# Patient Record
Sex: MALE | Race: WHITE | NOT HISPANIC OR LATINO | Employment: OTHER | URBAN - METROPOLITAN AREA
[De-identification: names, ages, dates, MRNs, and addresses within clinical notes are randomized per-mention and may not be internally consistent; named-entity substitution may affect disease eponyms.]

---

## 2017-11-09 PROBLEM — Z86.010 HISTORY OF COLON POLYPS: Status: ACTIVE | Noted: 2017-11-09

## 2018-09-27 PROBLEM — R10.11 RUQ PAIN: Status: ACTIVE | Noted: 2018-09-27

## 2018-09-27 PROBLEM — J45.40 MODERATE PERSISTENT ASTHMA: Status: ACTIVE | Noted: 2018-09-27

## 2018-09-27 PROBLEM — I63.9 CVA (CEREBRAL VASCULAR ACCIDENT): Status: ACTIVE | Noted: 2018-09-27

## 2018-10-30 PROBLEM — D84.9 IMMUNOCOMPROMISED STATE: Status: ACTIVE | Noted: 2018-10-30

## 2019-06-24 PROBLEM — K02.9 DENTAL DECAY: Status: ACTIVE | Noted: 2019-06-24

## 2019-06-24 PROBLEM — G47.00 INSOMNIA: Status: ACTIVE | Noted: 2019-06-24

## 2019-07-31 PROBLEM — M79.604 PAIN IN BOTH LOWER EXTREMITIES: Status: ACTIVE | Noted: 2019-07-31

## 2019-07-31 PROBLEM — Z82.49 FAMILY HISTORY OF CARDIOVASCULAR DISEASE: Status: ACTIVE | Noted: 2019-07-31

## 2019-07-31 PROBLEM — R94.39 ABNORMAL CARDIOVASCULAR STRESS TEST: Status: ACTIVE | Noted: 2019-07-31

## 2019-07-31 PROBLEM — R07.9 CHEST PAIN: Status: ACTIVE | Noted: 2019-07-31

## 2019-07-31 PROBLEM — M79.605 PAIN IN BOTH LOWER EXTREMITIES: Status: ACTIVE | Noted: 2019-07-31

## 2019-07-31 PROBLEM — I47.29 NSVT (NONSUSTAINED VENTRICULAR TACHYCARDIA): Status: ACTIVE | Noted: 2019-07-31

## 2019-07-31 PROBLEM — Z87.891 HISTORY OF TOBACCO ABUSE: Status: ACTIVE | Noted: 2019-07-31

## 2019-07-31 PROBLEM — R73.01 IFG (IMPAIRED FASTING GLUCOSE): Status: ACTIVE | Noted: 2019-07-31

## 2019-07-31 PROBLEM — E78.5 DYSLIPIDEMIA: Status: ACTIVE | Noted: 2019-07-31

## 2019-10-04 PROBLEM — I51.89 DIASTOLIC DYSFUNCTION: Status: ACTIVE | Noted: 2019-10-04

## 2019-10-07 PROBLEM — R94.39 ABNORMAL CARDIOVASCULAR STRESS TEST: Status: RESOLVED | Noted: 2019-07-31 | Resolved: 2019-10-07

## 2019-10-07 PROBLEM — I25.118 CORONARY ARTERY DISEASE OF NATIVE ARTERY OF NATIVE HEART WITH STABLE ANGINA PECTORIS: Status: ACTIVE | Noted: 2019-10-07

## 2019-10-07 PROBLEM — I37.1 NONRHEUMATIC PULMONARY VALVE INSUFFICIENCY: Status: ACTIVE | Noted: 2019-10-07

## 2019-10-07 PROBLEM — J98.4 LUNG DISEASE, RESTRICTIVE: Status: ACTIVE | Noted: 2019-10-07

## 2020-12-12 ENCOUNTER — SPECIALTY PHARMACY (OUTPATIENT)
Dept: PHARMACY | Facility: CLINIC | Age: 65
End: 2020-12-12

## 2020-12-21 ENCOUNTER — PATIENT MESSAGE (OUTPATIENT)
Dept: PHARMACY | Facility: CLINIC | Age: 65
End: 2020-12-21

## 2020-12-21 ENCOUNTER — TELEPHONE (OUTPATIENT)
Dept: PHARMACY | Facility: CLINIC | Age: 65
End: 2020-12-21

## 2020-12-28 ENCOUNTER — SPECIALTY PHARMACY (OUTPATIENT)
Dept: PHARMACY | Facility: CLINIC | Age: 65
End: 2020-12-28

## 2020-12-28 DIAGNOSIS — L40.9 PSORIASIS: Primary | ICD-10-CM

## 2020-12-28 NOTE — TELEPHONE ENCOUNTER
Specialty Pharmacy - Initial Clinical Assessment    Specialty Medication Orders Linked to Encounter      Most Recent Value   Medication #1  adalimumab (HUMIRA PEN) 40 mg/0.8 mL PnKt (Order#[id number], Rx#[id number])        Subjective    Clint Babb Jr. is a 65 y.o. male, who is followed by the specialty pharmacy service for management and education.    Recent Encounters     Date Type Provider Description    12/28/2020 Specialty Pharmacy Magdalena Dutton PharmD Initial Clinical Assessment    12/12/2020 Specialty Pharmacy Priscila Castillo PharmD Referral Authorization        Clinical call attempts since last clinical assessment   12/21/2020  3:01 PM - Specialty Pharmacy - Clinical Assessment by Magdalena Dutton, Araseli     Today he received education before his first fill with [facility].    Current Outpatient Medications   Medication Sig    adalimumab (HUMIRA PEN) 40 mg/0.8 mL PnKt Inject 40 mg into the skin every other week (Patient not taking: Reported on 12/22/2020)    albuterol (PROAIR HFA) 90 mcg/actuation inhaler Inhale 2 puffs into the lungs every 6 (six) hours as needed for Wheezing. Rescue    ammonium lactate 12 % Crea To be used on the legs twice a day    aspirin (ECOTRIN) 81 MG EC tablet Take 81 mg by mouth once daily.    atorvastatin (LIPITOR) 80 MG tablet Take 1 tablet (80 mg total) by mouth once daily.    BREO ELLIPTA 200-25 mcg/dose DsDv diskus inhaler INHALE 1 PUFF BY MOUTH INTO THE LUNGS ONCE A DAY    ergocalciferol (ERGOCALCIFEROL) 50,000 unit Cap Take 1 capsule (50,000 Units total) by mouth every 7 days. for 12 doses    lisinopriL (PRINIVIL,ZESTRIL) 2.5 MG tablet Take 1 tablet (2.5 mg total) by mouth every evening.    metoprolol tartrate (LOPRESSOR) 25 MG tablet Take 1 tablet (25 mg total) by mouth 2 (two) times daily.    pantoprazole (PROTONIX) 40 MG tablet TAKE 1 TABLET BY MOUTH ONCE A DAY BEFORE MEALS    tadalafiL (CIALIS) 20 MG Tab Take 1 tablet (20 mg total) by  mouth once daily.    tamsulosin (FLOMAX) 0.4 mg Cap TAKE 1 CAPSULE BY MOUTH ONCE A DAY    traZODone (DESYREL) 50 MG tablet Take 1 tablet (50 mg total) by mouth nightly as needed for Insomnia.    triamcinolone acetonide 0.1% (KENALOG) 0.1 % cream Apply topically 2 (two) times daily. Apply to upper and lower extremities, avoid face and genitals.    umeclidinium (INCRUSE ELLIPTA) 62.5 mcg/actuation inhalation capsule Inhale 1 capsule (63 mcg total) into the lungs once daily. Controller   Last reviewed on 12/28/2020 12:06 PM by Magdalena Dutton, PharmD    Review of patient's allergies indicates:  No Known AllergiesLast reviewed on  12/22/2020 7:44 AM by Joslyn Cisneros    Drug Interactions    Drug interactions evaluated: yes  Clinically relevant drug interactions identified: no  Provided the patient with educational material regarding drug interactions: not applicable         Adverse Effects    Rash: Pos       Assessment Questions - Documented Responses      Most Recent Value   Assessment   Medication Reconciliation completed for patient  Yes   During the past 4 weeks, has patient missed any activities due to condition or medication?  No   During the past 4 weeks, did patient have any of the following urgent care visits?  None   Goals of Therapy Status  Partially achieving   Welcome packet contents reviewed and discussed with patient?  Yes   Assesment completed?  Yes   Plan  Therapy continued   Do you need to open a clinical intervention (i-vent)?  No   Do you want to schedule first shipment?  Yes   Medication #1 Assessment Info   Patient status  Existing medication, New to [facility]   Is this medication appropriate for the patient?  Yes   Is this medication effective?  Yes        Refill Questions - Documented Responses      Most Recent Value   Relationship to patient of person spoken to?  Self   HIPAA/medical authority confirmed?  Yes   Can the patient store medication/sharps container properly (at the correct temperature,  "away from children/pets, etc.)?  Yes   Can the patient call emergency services (911) in the event of an emergency?  Yes   Does the patient have any concerns or questions about taking or administering this medication as prescribed?  No   During the past 4 weeks, has patient missed any activities due to condition or medication?  No   During the past 4 weeks, did patient have any of the following urgent care visits?  None   How will the patient receive the medication?  Mail   When does the patient need to receive the medication?  12/30/20   Shipping Address  Home   Address in [facility] confirmed and updated if neccessary?  Yes   Expected Copay ($)  0   Is the patient able to afford the medication copay?  Yes   Payment Method  zero copay   Days supply of Refill  28   Refill activity completed?  Yes   Refill activity plan  Refill scheduled   Shipment/Pickup Date:  12/28/20          Objective    He has a past medical history of Arthritis, Carpal tunnel syndrome, GERD (gastroesophageal reflux disease), Immunocompromised state (10/30/2018), Joint pain, Moderate persistent asthma (9/27/2018), Psoriasis, Skin disease, and SOB (shortness of breath).    BP Readings from Last 4 Encounters:   12/22/20 (!) 129/59   11/05/20 118/67   09/18/20 107/63   07/21/20 120/60     Ht Readings from Last 4 Encounters:   12/22/20 5' 7" (1.702 m)   11/30/20 5' 7" (1.702 m)   09/18/20 5' 7" (1.702 m)   07/21/20 5' 7" (1.702 m)     Wt Readings from Last 4 Encounters:   12/22/20 90.3 kg (199 lb 1.2 oz)   11/30/20 89.4 kg (197 lb 1.5 oz)   11/05/20 88.6 kg (195 lb 3.5 oz)   09/18/20 88.8 kg (195 lb 11.2 oz)     Recent Labs   Lab Result Units 12/15/20  0641 10/29/20  1035   Creatinine mg/dL 1.2 1.1   ALT U/L 21 27   AST U/L 18 18     The goals of Psoriasis treatment include:  · Reducing the size, thickness and extent of lesions and erythema  · Relieving the symptoms of psoriasis  · Improving and maintaining physical function  · Preventing " infection and other complications of treatment  · Reducing long term complications of psoriasis  · Minimizing psychological impact on overall well-being  · Improving or maintaining quality of life  · Maintaining optimal therapy adherence  · Minimizing and managing side effects    Goals of Therapy Status: Partially achieving    Assessment/Plan  Patient plans to start therapy on 12/30/20      Indication, dosage, appropriateness, effectiveness, safety and convenience of his specialty medication(s) were reviewed today.     Patient Counseling    Counseled the patient on the following: doses and administration discussed, safe handling, storage, and disposal discussed  Other topic: Pt declined consult       Humira consult declined, experienced to medication. Pt has been on Humira for a few years, but has been off for 2 months due to insurance. Pt plans to restart Humira on Wednesday 12/30. Advised pt to use a calendar or phone reminders to keep track of his injections. Pt reported since being off of therapy, he has had a few psoriasis spots return on his lower back and upper legs. Pt rated his pain as 7/10 (10 being best). Pt does not work. Pt reported no missed plans or visits to ER/urgent care in the last month due to symptoms. Medication list was verified against Epic and is current. Briefly reviewed dosage, storage, and administration with pt. [facility] refill process and services explained to pt. [facility] will call in 3 weeks for refill. Pt verbalized understanding. Pt had no further questions or concerns and was encouraged to call [facility] should any arise.    Tasks added this encounter   1/20/2021 - Refill Call (Auto Added)  6/19/2021 - Clinical - Follow Up Assesement (180 day)   Tasks due within next 3 months   No tasks due.     Magdalena Dutton, PharmD  [facility] - Specialty Pharmacy  [address] [place] [id number]  Phone: [phone]  Fax: [phone]

## 2021-01-25 ENCOUNTER — SPECIALTY PHARMACY (OUTPATIENT)
Dept: PHARMACY | Facility: CLINIC | Age: 66
End: 2021-01-25

## 2021-02-19 ENCOUNTER — SPECIALTY PHARMACY (OUTPATIENT)
Dept: PHARMACY | Facility: CLINIC | Age: 66
End: 2021-02-19

## 2021-03-15 ENCOUNTER — SPECIALTY PHARMACY (OUTPATIENT)
Dept: PHARMACY | Facility: CLINIC | Age: 66
End: 2021-03-15

## 2021-03-16 PROBLEM — I10 ESSENTIAL HYPERTENSION: Status: ACTIVE | Noted: 2021-03-16

## 2021-03-16 PROBLEM — I25.118 CORONARY ARTERY DISEASE OF NATIVE ARTERY OF NATIVE HEART WITH STABLE ANGINA PECTORIS: Status: RESOLVED | Noted: 2019-10-07 | Resolved: 2021-03-16

## 2021-03-16 PROBLEM — I25.10 NON-OCCLUSIVE CORONARY ARTERY DISEASE: Status: ACTIVE | Noted: 2021-03-16

## 2021-04-14 ENCOUNTER — SPECIALTY PHARMACY (OUTPATIENT)
Dept: PHARMACY | Facility: CLINIC | Age: 66
End: 2021-04-14

## 2021-05-14 ENCOUNTER — SPECIALTY PHARMACY (OUTPATIENT)
Dept: PHARMACY | Facility: CLINIC | Age: 66
End: 2021-05-14

## 2021-06-09 ENCOUNTER — SPECIALTY PHARMACY (OUTPATIENT)
Dept: PHARMACY | Facility: CLINIC | Age: 66
End: 2021-06-09

## 2021-07-01 ENCOUNTER — PATIENT MESSAGE (OUTPATIENT)
Dept: ADMINISTRATIVE | Facility: OTHER | Age: 66
End: 2021-07-01

## 2021-07-07 ENCOUNTER — SPECIALTY PHARMACY (OUTPATIENT)
Dept: PHARMACY | Facility: CLINIC | Age: 66
End: 2021-07-07

## 2021-08-04 ENCOUNTER — SPECIALTY PHARMACY (OUTPATIENT)
Dept: PHARMACY | Facility: CLINIC | Age: 66
End: 2021-08-04

## 2021-09-06 ENCOUNTER — PATIENT MESSAGE (OUTPATIENT)
Dept: PHARMACY | Facility: CLINIC | Age: 66
End: 2021-09-06

## 2021-09-27 ENCOUNTER — SPECIALTY PHARMACY (OUTPATIENT)
Dept: PHARMACY | Facility: CLINIC | Age: 66
End: 2021-09-27

## 2021-11-09 ENCOUNTER — SPECIALTY PHARMACY (OUTPATIENT)
Dept: PHARMACY | Facility: CLINIC | Age: 66
End: 2021-11-09

## 2021-11-09 RX ORDER — TADALAFIL 20 MG/1
20 TABLET ORAL DAILY PRN
COMMUNITY
End: 2022-01-05 | Stop reason: SDUPTHER

## 2021-11-09 RX ORDER — UBIDECARENONE 75 MG
500 CAPSULE ORAL DAILY
COMMUNITY

## 2021-12-01 ENCOUNTER — SPECIALTY PHARMACY (OUTPATIENT)
Dept: PHARMACY | Facility: CLINIC | Age: 66
End: 2021-12-01

## 2021-12-31 ENCOUNTER — SPECIALTY PHARMACY (OUTPATIENT)
Dept: PHARMACY | Facility: CLINIC | Age: 66
End: 2021-12-31

## 2022-02-01 ENCOUNTER — SPECIALTY PHARMACY (OUTPATIENT)
Dept: PHARMACY | Facility: CLINIC | Age: 67
End: 2022-02-01

## 2022-02-01 NOTE — TELEPHONE ENCOUNTER
Specialty Pharmacy - Refill Coordination    Specialty Medication Orders Linked to Encounter    Flowsheet Row Most Recent Value   Medication #1 adalimumab (HUMIRA PEN) PnKt injection (Order#[id number], Rx#[id number])          Refill Questions - Documented Responses    Flowsheet Row Most Recent Value   Patient Availability and HIPAA Verification    Does patient want to proceed with activity? Yes   HIPAA/medical authority confirmed? Yes   Relationship to patient of person spoken to? Self   Refill Screening Questions    Changes to allergies? No   Changes to medications? No   New conditions since last clinic visit? No   Unplanned office visit, urgent care, ED, or hospital admission in the last 4 weeks? No   How does patient/caregiver feel medication is working? Good   Financial problems or insurance changes? No   How many doses of your specialty medications were missed in the last 4 weeks? 0   Would patient like to speak to a pharmacist? No   When does the patient need to receive the medication? 02/09/22   Refill Delivery Questions    How will the patient receive the medication? Delivery Martha   When does the patient need to receive the medication? 02/09/22   Shipping Address Home   Address in [facility] confirmed and updated if neccessary? Yes   Expected Copay ($) 0   Is the patient able to afford the medication copay? Yes   Payment Method zero copay   Days supply of Refill 28   Supplies needed? No supplies needed   Refill activity completed? Yes   Refill activity plan Refill scheduled   Shipment/Pickup Date: 02/07/22          Current Outpatient Medications   Medication Sig    adalimumab (HUMIRA PEN) PnKt injection Inject 40 mg (1 pen) into the skin every other week. (Patient taking differently: Inject 40 mg (1 pen) into the skin every other week.)    albuterol (PROAIR HFA) 90 mcg/actuation inhaler Inhale 2 puffs into the lungs every 6 (six) hours as needed for Wheezing. Rescue    ammonium lactate 12 % Crea To  be used on the legs twice a day    aspirin (ECOTRIN) 81 MG EC tablet Take 81 mg by mouth once daily.    atorvastatin (LIPITOR) 80 MG tablet Take 1 tablet (80 mg total) by mouth once daily.    cyanocobalamin 500 MCG tablet Take 500 mcg by mouth once daily.    fluticasone-umeclidin-vilanter (TRELEGY ELLIPTA) 200-62.5-25 mcg inhaler Inhale 1 puff into the lungs once daily.    lisinopriL (PRINIVIL,ZESTRIL) 2.5 MG tablet Take 1 tablet (2.5 mg total) by mouth every evening.    metoprolol tartrate (LOPRESSOR) 25 MG tablet TAKE 1 TABLET BY MOUTH TWICE DAILY    pantoprazole (PROTONIX) 40 MG tablet TAKE 1 TABLET BY MOUTH ONCE A DAY BEFORE MEALS    tadalafiL (CIALIS) 20 MG Tab Take 1 tablet (20 mg total) by mouth daily as needed.    tamsulosin (FLOMAX) 0.4 mg Cap Take 1 capsule (0.4 mg total) by mouth once daily.    traZODone (DESYREL) 50 MG tablet Take 1 tablet (50 mg total) by mouth nightly as needed for Insomnia.    triamcinolone acetonide 0.1% (KENALOG) 0.1 % cream aaa bid x 1-2 wks, tk 1 wk off    vitamin D (VITAMIN D3) 1000 units Tab Take 1,000 Units by mouth once daily. Pt states takes 2 pills in morning   Last reviewed on 1/11/2022  3:55 PM by Sy Bond MD    Review of patient's allergies indicates:  No Known Allergies Last reviewed on  1/11/2022 3:55 PM by Sy Bond      Tasks added this encounter   No tasks added.   Tasks due within next 3 months   1/29/2022 - Refill Call (Auto Added)     Jean Lau  [facility] - Specialty Pharmacy  [address] [place] [id number]  Phone: [phone]  Fax: [phone]

## 2022-02-03 PROBLEM — K57.20 DIVERTICULITIS OF LARGE INTESTINE WITH PERFORATION AND ABSCESS: Status: ACTIVE | Noted: 2022-02-03

## 2022-02-25 ENCOUNTER — PATIENT OUTREACH (OUTPATIENT)
Dept: ADMINISTRATIVE | Facility: CLINIC | Age: 67
End: 2022-02-25

## 2022-02-25 NOTE — PROGRESS NOTES
C3 nurse spoke with Clint Babb Jr. for a TCC post hospital discharge follow up call. The patient does not have a scheduled HOSFU appointment with MILADY Moreno within 7 days post hospital discharge date 2/23/2022. C3 nurse was unable to schedule HOSFU appointment in Epic- do not have access to schedule with patient's PCP.    Message sent to PCP staff requesting they contact patient and schedule follow up appointment.

## 2022-03-01 PROBLEM — R62.7 FAILURE TO THRIVE IN ADULT: Status: ACTIVE | Noted: 2022-03-01

## 2022-03-09 ENCOUNTER — PATIENT OUTREACH (OUTPATIENT)
Dept: ADMINISTRATIVE | Facility: CLINIC | Age: 67
End: 2022-03-09

## 2022-03-09 NOTE — PROGRESS NOTES
C3 nurse spoke with Clint Babb Jr.  for a TCC post hospital discharge follow up call. The patient does not have a scheduled HOSFU appointment with MILADY Moreno  within 5-7 days post hospital discharge date 03/08/2022  C3 nurse was unable to schedule HOSFU appointment in [facility].  Please contact patient and schedule follow up appointment using HOSFU visit type on or before 03/15/2022

## 2022-04-18 ENCOUNTER — PATIENT MESSAGE (OUTPATIENT)
Dept: ADMINISTRATIVE | Facility: OTHER | Age: 67
End: 2022-04-18

## 2022-05-03 ENCOUNTER — PATIENT OUTREACH (OUTPATIENT)
Dept: ADMINISTRATIVE | Facility: CLINIC | Age: 67
End: 2022-05-03

## 2022-05-03 NOTE — PROGRESS NOTES
C3 nurse spoke with Clint Babb Jr.   for a TCC post hospital discharge follow up call. The patient has a scheduled HOSFU appointment with MILADY Moreno   on 5/9/22 @ 8am.

## 2022-05-03 NOTE — PROGRESS NOTES
C3 nurse attempted to contact patient. The following occurred:   C3 nurse attempted to contact Clint BYRON Babb Jr. for a TCC post hospital discharge follow up call. The patient is unable to conduct the call @ this time. The patient requested a callback.    The patient does not have a scheduled HOSFU appointment within 5-7 days post hospital discharge date 05/01/2022  Message sent to Physician staff to assist with HOSFU appointment scheduling.

## 2022-05-09 PROBLEM — Z98.890 STATUS POST REVERSAL OF ILEOSTOMY: Status: ACTIVE | Noted: 2022-05-09

## 2022-06-13 ENCOUNTER — SPECIALTY PHARMACY (OUTPATIENT)
Dept: PHARMACY | Facility: CLINIC | Age: 67
End: 2022-06-13

## 2022-06-13 NOTE — TELEPHONE ENCOUNTER
Spoke with patient who has been holding Humira. Patient has had several surgeries to address diverticulitis/perforation. He was seen by Dr. Page last month, and is continuing to hold Humira. Patient confirms that his psoriasis is doing well with no new rash/flare while holding Humira. Explained that we would disenroll him from [facility] at this time. Patient agrees to call [facility] if plan changes and he is to resume therapy.

## 2022-08-02 PROBLEM — N17.9 AKI (ACUTE KIDNEY INJURY): Status: ACTIVE | Noted: 2022-08-02

## 2022-08-02 PROBLEM — K40.90 UNILATERAL INGUINAL HERNIA: Status: ACTIVE | Noted: 2022-08-02

## 2022-08-02 PROBLEM — N39.0 UTI (URINARY TRACT INFECTION): Status: ACTIVE | Noted: 2022-08-02

## 2022-08-02 PROBLEM — I95.9 HYPOTENSION: Status: ACTIVE | Noted: 2022-08-02

## 2022-08-04 PROBLEM — N39.0 UTI (URINARY TRACT INFECTION): Status: RESOLVED | Noted: 2022-08-02 | Resolved: 2022-08-04

## 2022-08-04 PROBLEM — N17.9 AKI (ACUTE KIDNEY INJURY): Status: RESOLVED | Noted: 2022-08-02 | Resolved: 2022-08-04

## 2022-08-04 PROBLEM — I95.9 HYPOTENSION: Status: RESOLVED | Noted: 2022-08-02 | Resolved: 2022-08-04

## 2022-08-09 PROBLEM — R53.81 PHYSICAL DECONDITIONING: Status: ACTIVE | Noted: 2022-08-09

## 2022-08-10 PROBLEM — E87.29 INCREASED ANION GAP METABOLIC ACIDOSIS: Status: ACTIVE | Noted: 2022-08-10

## 2022-08-10 PROBLEM — N40.1 BENIGN PROSTATIC HYPERPLASIA WITH URINARY FREQUENCY: Status: ACTIVE | Noted: 2022-08-10

## 2022-08-10 PROBLEM — E87.6 ACUTE HYPOKALEMIA: Status: ACTIVE | Noted: 2022-08-10

## 2022-08-10 PROBLEM — R35.0 BENIGN PROSTATIC HYPERPLASIA WITH URINARY FREQUENCY: Status: ACTIVE | Noted: 2022-08-10

## 2022-08-12 PROBLEM — I95.1 ORTHOSTATIC HYPOTENSION: Status: ACTIVE | Noted: 2022-08-02

## 2022-08-13 PROBLEM — I63.9 CEREBROVASCULAR ACCIDENT (CVA): Status: RESOLVED | Noted: 2018-09-27 | Resolved: 2022-08-13

## 2022-08-13 PROBLEM — E87.29 INCREASED ANION GAP METABOLIC ACIDOSIS: Status: RESOLVED | Noted: 2022-08-10 | Resolved: 2022-08-13

## 2022-08-13 PROBLEM — E87.6 ACUTE HYPOKALEMIA: Status: RESOLVED | Noted: 2022-08-10 | Resolved: 2022-08-13

## 2022-08-22 PROBLEM — R55 SYNCOPE AND COLLAPSE: Status: ACTIVE | Noted: 2022-08-22

## 2022-08-22 PROBLEM — R42 LIGHTHEADEDNESS: Status: ACTIVE | Noted: 2022-08-22

## 2022-08-22 PROBLEM — Z01.818 PRE-OP EVALUATION: Status: ACTIVE | Noted: 2022-08-22

## 2022-10-12 PROBLEM — L40.9 PSORIASIS: Status: ACTIVE | Noted: 2022-10-12

## 2022-10-12 PROBLEM — N18.32 STAGE 3B CHRONIC KIDNEY DISEASE: Status: ACTIVE | Noted: 2022-10-12

## 2022-11-14 PROBLEM — N39.0 UTI (URINARY TRACT INFECTION): Status: RESOLVED | Noted: 2022-08-02 | Resolved: 2022-11-14

## 2022-11-14 PROBLEM — N17.9 AKI (ACUTE KIDNEY INJURY): Status: RESOLVED | Noted: 2022-08-02 | Resolved: 2022-11-14

## 2022-11-15 PROBLEM — I25.118 CORONARY ARTERY DISEASE OF NATIVE ARTERY OF NATIVE HEART WITH STABLE ANGINA PECTORIS: Status: RESOLVED | Noted: 2019-10-07 | Resolved: 2022-11-15

## 2022-11-15 PROBLEM — Z86.73 HISTORY OF STROKE: Status: ACTIVE | Noted: 2022-11-15

## 2022-11-15 PROBLEM — I95.9 HYPOTENSION: Status: RESOLVED | Noted: 2022-08-02 | Resolved: 2022-11-15

## 2022-11-21 PROBLEM — I63.9 CEREBROVASCULAR ACCIDENT (CVA): Status: RESOLVED | Noted: 2018-09-27 | Resolved: 2022-11-21

## 2022-12-22 PROBLEM — E43 SEVERE PROTEIN-CALORIE MALNUTRITION: Status: ACTIVE | Noted: 2022-12-22

## 2022-12-23 PROBLEM — N18.30 ACUTE RENAL FAILURE SUPERIMPOSED ON STAGE 3 CHRONIC KIDNEY DISEASE: Status: ACTIVE | Noted: 2022-12-23

## 2022-12-23 PROBLEM — E87.1 HYPONATREMIA: Status: ACTIVE | Noted: 2022-12-23

## 2022-12-23 PROBLEM — N19 HYPERPHOSPHATEMIA ASSOCIATED WITH RENAL FAILURE: Status: ACTIVE | Noted: 2022-12-23

## 2022-12-23 PROBLEM — N17.9 ACUTE RENAL FAILURE SUPERIMPOSED ON STAGE 3 CHRONIC KIDNEY DISEASE: Status: ACTIVE | Noted: 2022-12-23

## 2022-12-23 PROBLEM — E83.39 HYPERPHOSPHATEMIA ASSOCIATED WITH RENAL FAILURE: Status: ACTIVE | Noted: 2022-12-23

## 2022-12-23 PROBLEM — R13.10 DYSPHAGIA: Status: ACTIVE | Noted: 2022-12-23

## 2022-12-27 PROBLEM — N39.0 UTI (URINARY TRACT INFECTION): Status: RESOLVED | Noted: 2022-08-02 | Resolved: 2022-12-27

## 2022-12-27 PROBLEM — E87.1 HYPONATREMIA: Status: RESOLVED | Noted: 2022-12-23 | Resolved: 2022-12-27

## 2023-02-20 PROBLEM — N17.9 ARF (ACUTE RENAL FAILURE): Status: ACTIVE | Noted: 2023-02-20

## 2023-02-25 PROBLEM — R63.4 WEIGHT LOSS, UNINTENTIONAL: Status: ACTIVE | Noted: 2023-02-25

## 2023-05-22 PROBLEM — N18.30 ACUTE RENAL FAILURE SUPERIMPOSED ON STAGE 3 CHRONIC KIDNEY DISEASE: Status: RESOLVED | Noted: 2022-12-23 | Resolved: 2023-05-22

## 2023-05-22 PROBLEM — N17.9 ACUTE RENAL FAILURE SUPERIMPOSED ON STAGE 3 CHRONIC KIDNEY DISEASE: Status: RESOLVED | Noted: 2022-12-23 | Resolved: 2023-05-22

## 2023-09-27 PROBLEM — N18.6 ESRD (END STAGE RENAL DISEASE): Status: ACTIVE | Noted: 2023-09-27

## 2023-09-27 PROBLEM — N30.00 ACUTE CYSTITIS: Status: ACTIVE | Noted: 2023-09-27

## 2023-09-28 PROBLEM — R78.81 BACTEREMIA: Status: ACTIVE | Noted: 2023-09-28

## 2023-09-28 PROBLEM — I10 ESSENTIAL HYPERTENSION: Chronic | Status: ACTIVE | Noted: 2021-03-16

## 2023-09-29 PROBLEM — B95.61 MSSA BACTEREMIA: Status: ACTIVE | Noted: 2023-09-28

## 2023-09-29 PROBLEM — R50.9 FEVER: Status: ACTIVE | Noted: 2023-09-29

## 2023-10-02 PROBLEM — N30.00 ACUTE CYSTITIS: Status: RESOLVED | Noted: 2023-09-27 | Resolved: 2023-10-02

## 2023-12-19 PROBLEM — Z91.89 MULTIPLE RISK FACTORS FOR CORONARY ARTERY DISEASE: Status: ACTIVE | Noted: 2023-12-19

## 2023-12-19 PROBLEM — Z87.891 HISTORY OF TOBACCO ABUSE: Status: ACTIVE | Noted: 2023-12-19

## 2023-12-19 PROBLEM — I34.0 NONRHEUMATIC MITRAL VALVE REGURGITATION: Status: ACTIVE | Noted: 2023-12-19

## 2023-12-19 PROBLEM — N18.32 STAGE 3B CHRONIC KIDNEY DISEASE: Status: RESOLVED | Noted: 2022-10-12 | Resolved: 2023-12-19

## 2023-12-19 PROBLEM — I36.1 NONRHEUMATIC TRICUSPID VALVE REGURGITATION: Status: ACTIVE | Noted: 2023-12-19

## 2024-02-05 PROBLEM — N20.0 NEPHROLITHIASIS: Status: RESOLVED | Noted: 2024-02-05 | Resolved: 2024-02-05

## 2024-02-05 PROBLEM — N20.0 NEPHROLITHIASIS: Status: ACTIVE | Noted: 2024-02-05

## 2024-02-05 PROBLEM — Z96.0 RETAINED URETERAL STENT: Status: ACTIVE | Noted: 2024-02-05

## 2024-02-16 PROBLEM — E78.5 HYPERLIPIDEMIA: Status: ACTIVE | Noted: 2024-02-16

## 2024-02-17 PROBLEM — R91.1 PULMONARY NODULE 1 CM OR GREATER IN DIAMETER: Status: ACTIVE | Noted: 2024-02-17

## 2024-03-27 PROBLEM — B95.61 MSSA BACTEREMIA: Status: RESOLVED | Noted: 2023-09-28 | Resolved: 2024-03-27

## 2024-03-27 PROBLEM — Z01.818 PRE-OP EVALUATION: Status: RESOLVED | Noted: 2022-08-22 | Resolved: 2024-03-27

## 2024-03-27 PROBLEM — R78.81 MSSA BACTEREMIA: Status: RESOLVED | Noted: 2023-09-28 | Resolved: 2024-03-27

## 2024-03-27 PROBLEM — R82.71 ASYMPTOMATIC BACTERIURIA: Status: ACTIVE | Noted: 2024-03-27

## 2024-03-27 PROBLEM — R07.9 CHEST PAIN: Status: RESOLVED | Noted: 2019-07-31 | Resolved: 2024-03-27

## 2024-03-27 PROBLEM — I95.9 HYPOTENSION: Status: RESOLVED | Noted: 2022-08-02 | Resolved: 2024-03-27

## 2024-03-27 PROBLEM — R10.11 RUQ PAIN: Status: RESOLVED | Noted: 2018-09-27 | Resolved: 2024-03-27

## 2024-03-27 PROBLEM — N39.0 COMPLICATED UTI (URINARY TRACT INFECTION): Status: RESOLVED | Noted: 2022-08-02 | Resolved: 2024-03-27

## 2024-03-27 PROBLEM — Z22.358 ESBL E. COLI CARRIER: Status: ACTIVE | Noted: 2024-03-27

## 2024-03-27 PROBLEM — R50.9 FEVER: Status: RESOLVED | Noted: 2023-09-29 | Resolved: 2024-03-27
